# Patient Record
Sex: FEMALE | Employment: OTHER | URBAN - METROPOLITAN AREA
[De-identification: names, ages, dates, MRNs, and addresses within clinical notes are randomized per-mention and may not be internally consistent; named-entity substitution may affect disease eponyms.]

---

## 2017-08-10 ENCOUNTER — SEE NOTE (OUTPATIENT)
Dept: URBAN - METROPOLITAN AREA CLINIC 31 | Facility: CLINIC | Age: 57
Setting detail: DERMATOLOGY
End: 2017-08-10

## 2017-08-10 PROBLEM — L82.0 INFLAMED SEBORRHEIC KERATOSIS: Status: ACTIVE | Noted: 2017-08-10

## 2017-08-10 PROBLEM — L82.0 INFLAMED SEBORRHEIC KERATOSIS: Status: RESOLVED | Noted: 2017-08-10

## 2017-08-10 PROCEDURE — 11310 SHAVE SKIN LESION 0.5 CM/<: CPT

## 2017-08-10 PROCEDURE — 17110 DESTRUCT B9 LESION 1-14: CPT

## 2017-08-10 PROCEDURE — 11200 RMVL SKIN TAGS UP TO&INC 15: CPT

## 2017-11-09 ENCOUNTER — SKIN CANCER EXAM (OUTPATIENT)
Dept: URBAN - METROPOLITAN AREA CLINIC 31 | Facility: CLINIC | Age: 57
Setting detail: DERMATOLOGY
End: 2017-11-09

## 2017-11-09 PROBLEM — L82.0 INFLAMED SEBORRHEIC KERATOSIS: Status: RESOLVED | Noted: 2017-11-09

## 2017-11-09 PROBLEM — L82.0 INFLAMED SEBORRHEIC KERATOSIS: Status: ACTIVE | Noted: 2017-11-09

## 2017-11-09 PROCEDURE — 17110 DESTRUCT B9 LESION 1-14: CPT

## 2017-11-09 PROCEDURE — 99214 OFFICE O/P EST MOD 30 MIN: CPT

## 2017-11-09 PROCEDURE — 11301 SHAVE SKIN LESION 0.6-1.0 CM: CPT

## 2019-02-12 ENCOUNTER — HAIR LOSS (OUTPATIENT)
Dept: URBAN - METROPOLITAN AREA CLINIC 31 | Facility: CLINIC | Age: 59
Setting detail: DERMATOLOGY
End: 2019-02-12

## 2019-02-12 ENCOUNTER — RX ONLY (RX ONLY)
Age: 59
End: 2019-02-12

## 2019-02-12 DIAGNOSIS — L57.0 ACTINIC KERATOSIS: ICD-10-CM

## 2019-02-12 PROBLEM — L660 704.09: Status: ACTIVE | Noted: 2019-02-12

## 2019-02-12 PROBLEM — L668 704.09: Status: ACTIVE | Noted: 2019-02-12

## 2019-02-12 PROBLEM — L658 704.09: Status: ACTIVE | Noted: 2019-02-12

## 2019-02-12 PROBLEM — L662 704.09: Status: ACTIVE | Noted: 2019-02-12

## 2019-02-12 PROCEDURE — 17003 DESTRUCT PREMALG LES 2-14: CPT

## 2019-02-12 PROCEDURE — 36415 COLL VENOUS BLD VENIPUNCTURE: CPT

## 2019-02-12 PROCEDURE — 99213 OFFICE O/P EST LOW 20 MIN: CPT

## 2019-02-12 PROCEDURE — 17000 DESTRUCT PREMALG LESION: CPT

## 2019-07-23 ENCOUNTER — FOLLOW UP (OUTPATIENT)
Dept: URBAN - METROPOLITAN AREA CLINIC 31 | Facility: CLINIC | Age: 59
Setting detail: DERMATOLOGY
End: 2019-07-23

## 2019-07-23 DIAGNOSIS — D48.5 NEOPLASM OF UNCERTAIN BEHAVIOR OF SKIN: ICD-10-CM

## 2019-07-23 PROBLEM — L659 704.00: Status: ACTIVE | Noted: 2019-07-23

## 2019-07-23 PROBLEM — L82.0 INFLAMED SEBORRHEIC KERATOSIS: Status: RESOLVED | Noted: 2019-07-23

## 2019-07-23 PROBLEM — L82.0 INFLAMED SEBORRHEIC KERATOSIS: Status: ACTIVE | Noted: 2019-07-23

## 2019-07-23 PROCEDURE — 17110 DESTRUCT B9 LESION 1-14: CPT

## 2019-07-23 PROCEDURE — 99213 OFFICE O/P EST LOW 20 MIN: CPT

## 2021-05-04 ENCOUNTER — WORRISOME GROWTH - SEE NOTE (OUTPATIENT)
Dept: URBAN - METROPOLITAN AREA CLINIC 31 | Facility: CLINIC | Age: 61
Setting detail: DERMATOLOGY
End: 2021-05-04

## 2021-05-04 DIAGNOSIS — L92.0 GRANULOMA ANNULARE: ICD-10-CM

## 2021-05-04 PROBLEM — L82.0 INFLAMED SEBORRHEIC KERATOSIS: Status: ACTIVE | Noted: 2021-05-04

## 2021-05-04 PROBLEM — L82.0 INFLAMED SEBORRHEIC KERATOSIS: Status: RESOLVED | Noted: 2021-05-04

## 2021-05-04 PROCEDURE — 17110 DESTRUCT B9 LESION 1-14: CPT

## 2021-12-10 ENCOUNTER — OFFICE VISIT (OUTPATIENT)
Dept: RURAL CLINIC 2 | Facility: CLINIC | Age: 61
End: 2021-12-10

## 2021-12-10 RX ORDER — ZOLPIDEM TARTRATE 10 MG/1
1 TABLET AT BEDTIME AS NEEDED TABLET, FILM COATED ORAL ONCE A DAY
Status: ACTIVE | COMMUNITY

## 2021-12-10 RX ORDER — CITALOPRAM HYDROBROMIDE 40 MG/1
0.5 TABLET TABLET, FILM COATED ORAL ONCE A DAY
Status: ACTIVE | COMMUNITY

## 2021-12-10 RX ORDER — PHENTERMINE HYDROCHLORIDE 37.5 MG/1
1 CAPSULE CAPSULE ORAL ONCE A DAY
Status: ACTIVE | COMMUNITY

## 2021-12-10 RX ORDER — TIZANIDINE 4 MG/1
1 TABLET AS NEEDED TABLET ORAL THREE TIMES A DAY
Status: ACTIVE | COMMUNITY

## 2021-12-10 RX ORDER — TOPIRAMATE 50 MG/1
1 TABLET TABLET, FILM COATED ORAL ONCE A DAY
Status: ACTIVE | COMMUNITY

## 2021-12-10 RX ORDER — LEVOTHYROXINE SODIUM 100 UG/1
1 TABLET IN THE MORNING ON AN EMPTY STOMACH TABLET ORAL ONCE A DAY
Status: ACTIVE | COMMUNITY

## 2021-12-10 RX ORDER — LISINOPRIL 40 MG/1
1 TABLET TABLET ORAL ONCE A DAY
Status: ACTIVE | COMMUNITY

## 2021-12-10 RX ORDER — VALACYCLOVIR 500 MG/1
1 TABLET TABLET, FILM COATED ORAL ONCE A DAY
Status: ACTIVE | COMMUNITY

## 2021-12-10 RX ORDER — BUSPIRONE HYDROCHLORIDE 15 MG/1
1 TABLET TABLET ORAL TWICE A DAY
Status: ACTIVE | COMMUNITY

## 2021-12-10 RX ORDER — AMLODIPINE BESYLATE 5 MG/1
1 TABLET TABLET ORAL ONCE A DAY
Status: ACTIVE | COMMUNITY

## 2021-12-10 RX ORDER — HYDROCODONE BITARTRATE AND ACETAMINOPHEN 10; 325 MG/1; MG/1
1 TABLET AS NEEDED TABLET ORAL
Status: ACTIVE | COMMUNITY

## 2021-12-10 RX ORDER — ATORVASTATIN CALCIUM 20 MG/1
1 TABLET TABLET, FILM COATED ORAL ONCE A DAY
Status: ACTIVE | COMMUNITY

## 2021-12-10 RX ORDER — METOPROLOL TARTRATE 50 MG/1
1 TABLET WITH FOOD TABLET, FILM COATED ORAL TWICE A DAY
Status: ACTIVE | COMMUNITY

## 2021-12-10 RX ORDER — CYCLOBENZAPRINE HYDROCHLORIDE 10 MG/1
1 TABLET AT BEDTIME AS NEEDED TABLET, FILM COATED ORAL ONCE A DAY
Status: ACTIVE | COMMUNITY

## 2022-03-11 ENCOUNTER — WEB ENCOUNTER (OUTPATIENT)
Dept: RURAL CLINIC 2 | Facility: CLINIC | Age: 62
End: 2022-03-11

## 2022-03-11 ENCOUNTER — OFFICE VISIT (OUTPATIENT)
Dept: RURAL CLINIC 2 | Facility: CLINIC | Age: 62
End: 2022-03-11
Payer: COMMERCIAL

## 2022-03-11 ENCOUNTER — DASHBOARD ENCOUNTERS (OUTPATIENT)
Age: 62
End: 2022-03-11

## 2022-03-11 DIAGNOSIS — I10 HTN (HYPERTENSION), BENIGN: ICD-10-CM

## 2022-03-11 DIAGNOSIS — E03.9 ACQUIRED HYPOTHYROIDISM: ICD-10-CM

## 2022-03-11 DIAGNOSIS — Z83.71 FAMILY HISTORY OF COLONIC POLYPS: ICD-10-CM

## 2022-03-11 DIAGNOSIS — Z86.010 HISTORY OF COLONIC POLYPS: ICD-10-CM

## 2022-03-11 PROBLEM — 111566002: Status: ACTIVE | Noted: 2022-03-11

## 2022-03-11 PROBLEM — 10725009: Status: ACTIVE | Noted: 2022-03-11

## 2022-03-11 PROCEDURE — 99203 OFFICE O/P NEW LOW 30 MIN: CPT | Performed by: INTERNAL MEDICINE

## 2022-03-11 RX ORDER — TOPIRAMATE 50 MG/1
1 TABLET TABLET, FILM COATED ORAL ONCE A DAY
Status: ACTIVE | COMMUNITY

## 2022-03-11 RX ORDER — METOPROLOL TARTRATE 50 MG/1
1 TABLET WITH FOOD TABLET, FILM COATED ORAL TWICE A DAY
Status: ACTIVE | COMMUNITY

## 2022-03-11 RX ORDER — LISINOPRIL 40 MG/1
1 TABLET TABLET ORAL ONCE A DAY
Status: ACTIVE | COMMUNITY

## 2022-03-11 RX ORDER — CITALOPRAM HYDROBROMIDE 40 MG/1
0.5 TABLET TABLET, FILM COATED ORAL ONCE A DAY
Status: ACTIVE | COMMUNITY

## 2022-03-11 RX ORDER — SODIUM, POTASSIUM,MAG SULFATES 17.5-3.13G
177 ML SOLUTION, RECONSTITUTED, ORAL ORAL
Qty: 1 KIT | Refills: 0 | OUTPATIENT
Start: 2022-03-11

## 2022-03-11 RX ORDER — ZOLPIDEM TARTRATE 10 MG/1
1 TABLET AT BEDTIME AS NEEDED TABLET, FILM COATED ORAL ONCE A DAY
Status: ACTIVE | COMMUNITY

## 2022-03-11 RX ORDER — BISACODYL 5 MG
TAKE 4 TABLET, DELAYED RELEASE (ENTERIC COATED) ORAL
Qty: 4 | OUTPATIENT
Start: 2022-03-11 | End: 2022-03-12

## 2022-03-11 RX ORDER — VALACYCLOVIR 500 MG/1
1 TABLET TABLET, FILM COATED ORAL ONCE A DAY
Status: DISCONTINUED | COMMUNITY

## 2022-03-11 RX ORDER — LEVOTHYROXINE SODIUM 100 UG/1
1 TABLET IN THE MORNING ON AN EMPTY STOMACH TABLET ORAL ONCE A DAY
Status: ACTIVE | COMMUNITY

## 2022-03-11 RX ORDER — TIZANIDINE 4 MG/1
1 TABLET AS NEEDED TABLET ORAL THREE TIMES A DAY
Status: ACTIVE | COMMUNITY

## 2022-03-11 RX ORDER — AMLODIPINE BESYLATE 5 MG/1
1 TABLET TABLET ORAL ONCE A DAY
Status: ACTIVE | COMMUNITY

## 2022-03-11 RX ORDER — HYDROCODONE BITARTRATE AND ACETAMINOPHEN 10; 325 MG/1; MG/1
1 TABLET AS NEEDED TABLET ORAL
Status: ACTIVE | COMMUNITY

## 2022-03-11 RX ORDER — PHENTERMINE HYDROCHLORIDE 37.5 MG/1
1 CAPSULE CAPSULE ORAL ONCE A DAY
Status: DISCONTINUED | COMMUNITY

## 2022-03-11 RX ORDER — ATORVASTATIN CALCIUM 20 MG/1
1 TABLET TABLET, FILM COATED ORAL ONCE A DAY
Status: ACTIVE | COMMUNITY

## 2022-03-11 RX ORDER — CYCLOBENZAPRINE HYDROCHLORIDE 10 MG/1
1 TABLET AT BEDTIME AS NEEDED TABLET, FILM COATED ORAL ONCE A DAY
Status: ACTIVE | COMMUNITY

## 2022-03-11 RX ORDER — BUSPIRONE HYDROCHLORIDE 15 MG/1
1 TABLET TABLET ORAL TWICE A DAY
Status: DISCONTINUED | COMMUNITY

## 2022-03-30 ENCOUNTER — SEE NOTE (OUTPATIENT)
Dept: URBAN - METROPOLITAN AREA CLINIC 31 | Facility: CLINIC | Age: 62
Setting detail: DERMATOLOGY
End: 2022-03-30

## 2022-03-30 DIAGNOSIS — Z09 ENCOUNTER FOR FOLLOW-UP EXAMINATION AFTER COMPLETED TREATMENT FOR CONDITIONS OTHER THAN MALIGNANT NEOPLASM: ICD-10-CM

## 2022-03-30 PROBLEM — L82.1 OTHER SEBORRHEIC KERATOSIS: Status: ACTIVE | Noted: 2022-03-30

## 2022-03-30 PROBLEM — D18.01 HEMANGIOMA OF SKIN AND SUBCUTANEOUS TISSUE: Status: ACTIVE | Noted: 2022-03-30

## 2022-03-30 PROBLEM — L72.0 EPIDERMAL CYST: Status: RESOLVED | Noted: 2022-03-30

## 2022-03-30 PROBLEM — L72.0 EPIDERMAL CYST: Status: ACTIVE | Noted: 2022-03-30

## 2022-03-30 PROBLEM — L82.0 INFLAMED SEBORRHEIC KERATOSIS: Status: RESOLVED | Noted: 2022-03-30

## 2022-03-30 PROBLEM — L82.0 INFLAMED SEBORRHEIC KERATOSIS: Status: ACTIVE | Noted: 2022-03-30

## 2022-03-30 PROBLEM — D18.01 HEMANGIOMA OF SKIN AND SUBCUTANEOUS TISSUE: Status: RESOLVED | Noted: 2022-03-30

## 2022-03-30 PROBLEM — L82.1 OTHER SEBORRHEIC KERATOSIS: Status: RESOLVED | Noted: 2022-03-30

## 2022-03-30 PROCEDURE — 10040 ACNE SURGERY: CPT

## 2022-03-30 PROCEDURE — 99213 OFFICE O/P EST LOW 20 MIN: CPT

## 2022-03-30 PROCEDURE — 17110 DESTRUCT B9 LESION 1-14: CPT

## 2022-05-02 PROBLEM — 428283002: Status: ACTIVE | Noted: 2022-03-11

## 2022-05-12 ENCOUNTER — OFFICE VISIT (OUTPATIENT)
Dept: RURAL MEDICAL CENTER 4 | Facility: MEDICAL CENTER | Age: 62
End: 2022-05-12

## 2022-09-08 ENCOUNTER — RX ONLY (RX ONLY)
Age: 62
End: 2022-09-08

## 2022-09-08 ENCOUNTER — APPOINTMENT (OUTPATIENT)
Dept: URBAN - METROPOLITAN AREA CLINIC 206 | Age: 62
Setting detail: DERMATOLOGY
End: 2022-09-10

## 2022-09-08 VITALS — WEIGHT: 145 LBS

## 2022-09-08 DIAGNOSIS — B00.1 HERPESVIRAL VESICULAR DERMATITIS: ICD-10-CM

## 2022-09-08 DIAGNOSIS — L663 OTHER SPECIFIED DISEASES OF HAIR AND HAIR FOLLICLES: ICD-10-CM

## 2022-09-08 DIAGNOSIS — L738 OTHER SPECIFIED DISEASES OF HAIR AND HAIR FOLLICLES: ICD-10-CM

## 2022-09-08 PROBLEM — L02.02 FURUNCLE OF FACE: Status: ACTIVE | Noted: 2022-09-08

## 2022-09-08 PROCEDURE — OTHER ORDER TESTS: OTHER

## 2022-09-08 PROCEDURE — OTHER INJECTION: OTHER

## 2022-09-08 PROCEDURE — OTHER COUNSELING: OTHER

## 2022-09-08 PROCEDURE — 96372 THER/PROPH/DIAG INJ SC/IM: CPT

## 2022-09-08 PROCEDURE — 99213 OFFICE O/P EST LOW 20 MIN: CPT | Mod: 25

## 2022-09-08 PROCEDURE — OTHER PRESCRIPTION: OTHER

## 2022-09-08 RX ORDER — GENTAMICIN SULFATE 1 MG/G
CREAM TOPICAL
Qty: 30 | Refills: 1 | Status: ERX

## 2022-09-08 RX ORDER — GENTAMICIN SULFATE 1 MG/G
CREAM TOPICAL
Qty: 30 | Refills: 1 | Status: ERX | COMMUNITY
Start: 2022-09-08

## 2022-09-08 ASSESSMENT — LOCATION SIMPLE DESCRIPTION DERM
LOCATION SIMPLE: RIGHT BUTTOCK
LOCATION SIMPLE: RIGHT LIP
LOCATION SIMPLE: RIGHT NOSE
LOCATION SIMPLE: CHIN
LOCATION SIMPLE: LEFT NOSE

## 2022-09-08 ASSESSMENT — LOCATION ZONE DERM
LOCATION ZONE: LIP
LOCATION ZONE: FACE
LOCATION ZONE: NOSE
LOCATION ZONE: TRUNK

## 2022-09-08 ASSESSMENT — LOCATION DETAILED DESCRIPTION DERM
LOCATION DETAILED: RIGHT CHIN
LOCATION DETAILED: RIGHT LOWER CUTANEOUS LIP
LOCATION DETAILED: LEFT NARIS
LOCATION DETAILED: RIGHT BUTTOCK
LOCATION DETAILED: RIGHT NARIS

## 2022-09-08 NOTE — PROCEDURE: COUNSELING
Detail Level: Detailed
Patient Specific Counseling (Will Not Stick From Patient To Patient): Advil every 8 hrs

## 2023-01-25 ENCOUNTER — CLAIMS CREATED FROM THE CLAIM WINDOW (OUTPATIENT)
Dept: RURAL MEDICAL CENTER 4 | Facility: MEDICAL CENTER | Age: 63
End: 2023-01-25
Payer: COMMERCIAL

## 2023-01-25 ENCOUNTER — OFFICE VISIT (OUTPATIENT)
Dept: RURAL MEDICAL CENTER 4 | Facility: MEDICAL CENTER | Age: 63
End: 2023-01-25

## 2023-01-25 ENCOUNTER — CLAIMS CREATED FROM THE CLAIM WINDOW (OUTPATIENT)
Dept: RURAL MEDICAL CENTER 4 | Facility: MEDICAL CENTER | Age: 63
End: 2023-01-25

## 2023-01-25 DIAGNOSIS — Z86.010 ADENOMAS PERSONAL HISTORY OF COLONIC POLYPS: ICD-10-CM

## 2023-01-25 DIAGNOSIS — D12.2 ADENOMA OF ASCENDING COLON: ICD-10-CM

## 2023-01-25 PROCEDURE — 45385 COLONOSCOPY W/LESION REMOVAL: CPT | Performed by: INTERNAL MEDICINE

## 2023-01-30 ENCOUNTER — TELEPHONE ENCOUNTER (OUTPATIENT)
Dept: URBAN - METROPOLITAN AREA CLINIC 105 | Facility: CLINIC | Age: 63
End: 2023-01-30

## 2023-04-10 ENCOUNTER — APPOINTMENT (OUTPATIENT)
Dept: URBAN - METROPOLITAN AREA CLINIC 206 | Age: 63
Setting detail: DERMATOLOGY
End: 2023-04-15

## 2023-04-10 DIAGNOSIS — D18.0 HEMANGIOMA: ICD-10-CM

## 2023-04-10 DIAGNOSIS — I87.2 VENOUS INSUFFICIENCY (CHRONIC) (PERIPHERAL): ICD-10-CM

## 2023-04-10 DIAGNOSIS — L82.1 OTHER SEBORRHEIC KERATOSIS: ICD-10-CM

## 2023-04-10 DIAGNOSIS — Z71.89 OTHER SPECIFIED COUNSELING: ICD-10-CM

## 2023-04-10 DIAGNOSIS — D22 MELANOCYTIC NEVI: ICD-10-CM

## 2023-04-10 DIAGNOSIS — Z85.828 PERSONAL HISTORY OF OTHER MALIGNANT NEOPLASM OF SKIN: ICD-10-CM

## 2023-04-10 DIAGNOSIS — I78.8 OTHER DISEASES OF CAPILLARIES: ICD-10-CM

## 2023-04-10 DIAGNOSIS — L82.0 INFLAMED SEBORRHEIC KERATOSIS: ICD-10-CM

## 2023-04-10 DIAGNOSIS — L57.8 OTHER SKIN CHANGES DUE TO CHRONIC EXPOSURE TO NONIONIZING RADIATION: ICD-10-CM

## 2023-04-10 PROBLEM — D18.01 HEMANGIOMA OF SKIN AND SUBCUTANEOUS TISSUE: Status: ACTIVE | Noted: 2023-04-10

## 2023-04-10 PROBLEM — D22.5 MELANOCYTIC NEVI OF TRUNK: Status: ACTIVE | Noted: 2023-04-10

## 2023-04-10 PROCEDURE — 17110 DESTRUCT B9 LESION 1-14: CPT

## 2023-04-10 PROCEDURE — OTHER ADDITIONAL NOTES: OTHER

## 2023-04-10 PROCEDURE — OTHER COUNSELING: OTHER

## 2023-04-10 PROCEDURE — OTHER PRESCRIPTION: OTHER

## 2023-04-10 PROCEDURE — 99214 OFFICE O/P EST MOD 30 MIN: CPT | Mod: 25

## 2023-04-10 PROCEDURE — OTHER LIQUID NITROGEN: OTHER

## 2023-04-10 PROCEDURE — OTHER MIPS QUALITY: OTHER

## 2023-04-10 RX ORDER — TRIAMCINOLONE ACETONIDE 1 MG/G
CREAM TOPICAL
Qty: 454 | Refills: 4 | Status: ERX | COMMUNITY
Start: 2023-04-10

## 2023-04-10 ASSESSMENT — LOCATION SIMPLE DESCRIPTION DERM
LOCATION SIMPLE: LEFT FOREARM
LOCATION SIMPLE: RIGHT FOREARM
LOCATION SIMPLE: CHEST
LOCATION SIMPLE: RIGHT UPPER BACK
LOCATION SIMPLE: LOWER BACK
LOCATION SIMPLE: LEFT PRETIBIAL REGION
LOCATION SIMPLE: INFERIOR FOREHEAD
LOCATION SIMPLE: RIGHT HAND
LOCATION SIMPLE: LEFT UPPER BACK
LOCATION SIMPLE: ABDOMEN
LOCATION SIMPLE: RIGHT LOWER BACK
LOCATION SIMPLE: NOSE
LOCATION SIMPLE: RIGHT PRETIBIAL REGION

## 2023-04-10 ASSESSMENT — LOCATION ZONE DERM
LOCATION ZONE: TRUNK
LOCATION ZONE: ARM
LOCATION ZONE: LEG
LOCATION ZONE: FACE
LOCATION ZONE: NOSE
LOCATION ZONE: HAND

## 2023-04-10 ASSESSMENT — LOCATION DETAILED DESCRIPTION DERM
LOCATION DETAILED: INFERIOR MID FOREHEAD
LOCATION DETAILED: RIGHT RADIAL DORSAL HAND
LOCATION DETAILED: SUPERIOR LUMBAR SPINE
LOCATION DETAILED: UPPER STERNUM
LOCATION DETAILED: MIDDLE STERNUM
LOCATION DETAILED: RIGHT DISTAL PRETIBIAL REGION
LOCATION DETAILED: RIGHT SUPERIOR UPPER BACK
LOCATION DETAILED: RIGHT VENTRAL PROXIMAL FOREARM
LOCATION DETAILED: LEFT VENTRAL PROXIMAL FOREARM
LOCATION DETAILED: LEFT PROXIMAL DORSAL FOREARM
LOCATION DETAILED: RIGHT VENTRAL DISTAL FOREARM
LOCATION DETAILED: LEFT LATERAL ABDOMEN
LOCATION DETAILED: LEFT VENTRAL DISTAL FOREARM
LOCATION DETAILED: NASAL TIP
LOCATION DETAILED: RIGHT PROXIMAL DORSAL FOREARM
LOCATION DETAILED: LEFT MEDIAL UPPER BACK
LOCATION DETAILED: RIGHT DISTAL DORSAL FOREARM
LOCATION DETAILED: RIGHT SUPERIOR LATERAL MIDBACK
LOCATION DETAILED: LEFT PROXIMAL PRETIBIAL REGION

## 2023-04-10 NOTE — PROCEDURE: COUNSELING
Detail Level: Detailed
Detail Level: Generalized
Detail Level: Zone
Prescription Strength Graduated Compression Stockings Recommendations: The patient was counseled that prescription strength graduated compression stockings should be worn for all waking hours. They will follow up with a venous specialist to monitor graduated compression stocking usage and their symptoms.

## 2023-04-10 NOTE — HPI: FULL BODY SKIN EXAMINATION
What Is The Reason For Today's Visit?: Full Body Skin Examination
What Is The Reason For Today's Visit? (Being Monitored For X): concerning skin lesions on an annual basis
Additional History: Worrisome spots on leg and back

## 2023-04-10 NOTE — PROCEDURE: LIQUID NITROGEN
Add 52 Modifier (Optional): no
Show Applicator Variable?: Yes
Medical Necessity Information: It is in your best interest to select a reason for this procedure from the list below. All of these items fulfill various CMS LCD requirements except the new and changing color options.
Consent: The patient's consent was obtained including but not limited to risks of crusting, scabbing, blistering, scarring, darker or lighter pigmentary change, recurrence, incomplete removal and infection.
Medical Necessity Clause: This procedure was medically necessary because the lesions that were treated were:
Detail Level: Detailed
Spray Paint Text: The liquid nitrogen was applied to the skin utilizing a spray paint frosting technique.
Post-Care Instructions: I reviewed with the patient in detail post-care instructions. Patient is to wear sunprotection, and avoid picking at any of the treated lesions. Pt may apply Vaseline to crusted or scabbing areas.

## 2023-04-10 NOTE — PROCEDURE: ADDITIONAL NOTES
Additional Notes: EyelidATL: Jose Plata M.D. [facility]\\nAddress: [address], [place] [address]\\nPhone: [phone] Additional Notes: EyelidATL: Jose Plata M.D. [facility]\\nAddress: [address], [place] [address]\\nPhone: [phone]